# Patient Record
Sex: FEMALE | Race: NATIVE HAWAIIAN OR OTHER PACIFIC ISLANDER
[De-identification: names, ages, dates, MRNs, and addresses within clinical notes are randomized per-mention and may not be internally consistent; named-entity substitution may affect disease eponyms.]

---

## 2021-10-04 PROBLEM — Z00.00 ENCOUNTER FOR PREVENTIVE HEALTH EXAMINATION: Status: ACTIVE | Noted: 2021-10-04

## 2021-10-18 ENCOUNTER — APPOINTMENT (OUTPATIENT)
Dept: OBGYN | Facility: CLINIC | Age: 51
End: 2021-10-18
Payer: COMMERCIAL

## 2021-10-18 VITALS
DIASTOLIC BLOOD PRESSURE: 80 MMHG | WEIGHT: 200 LBS | RESPIRATION RATE: 18 BRPM | SYSTOLIC BLOOD PRESSURE: 126 MMHG | OXYGEN SATURATION: 98 % | BODY MASS INDEX: 34.15 KG/M2 | HEIGHT: 64 IN

## 2021-10-18 DIAGNOSIS — Z01.419 ENCOUNTER FOR GYNECOLOGICAL EXAMINATION (GENERAL) (ROUTINE) W/OUT ABNORMAL FINDINGS: ICD-10-CM

## 2021-10-18 PROCEDURE — 99396 PREV VISIT EST AGE 40-64: CPT

## 2021-10-20 LAB
C TRACH RRNA SPEC QL NAA+PROBE: NOT DETECTED
HPV HIGH+LOW RISK DNA PNL CVX: NOT DETECTED
N GONORRHOEA RRNA SPEC QL NAA+PROBE: NOT DETECTED
SOURCE TP AMPLIFICATION: NORMAL

## 2024-04-27 ENCOUNTER — OFFICE (OUTPATIENT)
Dept: URBAN - METROPOLITAN AREA CLINIC 12 | Facility: CLINIC | Age: 54
Setting detail: OPHTHALMOLOGY
End: 2024-04-27
Payer: COMMERCIAL

## 2024-04-27 DIAGNOSIS — H16.041: ICD-10-CM

## 2024-04-27 PROCEDURE — 99203 OFFICE O/P NEW LOW 30 MIN: CPT | Performed by: OPTOMETRIST

## 2024-05-07 ENCOUNTER — OFFICE (OUTPATIENT)
Dept: URBAN - METROPOLITAN AREA CLINIC 103 | Facility: CLINIC | Age: 54
Setting detail: OPHTHALMOLOGY
End: 2024-05-07
Payer: COMMERCIAL

## 2024-05-07 DIAGNOSIS — H16.223: ICD-10-CM

## 2024-05-07 DIAGNOSIS — H52.4: ICD-10-CM

## 2024-05-07 PROCEDURE — 92015 DETERMINE REFRACTIVE STATE: CPT | Performed by: OPTOMETRIST

## 2024-05-07 PROCEDURE — 92012 INTRM OPH EXAM EST PATIENT: CPT | Performed by: OPTOMETRIST

## 2024-05-07 ASSESSMENT — CONFRONTATIONAL VISUAL FIELD TEST (CVF)
OD_FINDINGS: FULL
OS_FINDINGS: FULL

## 2024-10-02 ENCOUNTER — APPOINTMENT (OUTPATIENT)
Dept: ORTHOPEDIC SURGERY | Facility: CLINIC | Age: 54
End: 2024-10-02

## 2024-10-02 DIAGNOSIS — M75.32 CALCIFIC TENDINITIS OF LEFT SHOULDER: ICD-10-CM

## 2024-10-02 DIAGNOSIS — Z78.9 OTHER SPECIFIED HEALTH STATUS: ICD-10-CM

## 2024-10-02 DIAGNOSIS — M75.22 BICIPITAL TENDINITIS, LEFT SHOULDER: ICD-10-CM

## 2024-10-02 DIAGNOSIS — M25.512 PAIN IN LEFT SHOULDER: ICD-10-CM

## 2024-10-02 DIAGNOSIS — M75.42 IMPINGEMENT SYNDROME OF LEFT SHOULDER: ICD-10-CM

## 2024-10-02 PROCEDURE — 73030 X-RAY EXAM OF SHOULDER: CPT | Mod: LT

## 2024-10-02 PROCEDURE — 99204 OFFICE O/P NEW MOD 45 MIN: CPT

## 2024-10-02 PROCEDURE — 73010 X-RAY EXAM OF SHOULDER BLADE: CPT | Mod: LT

## 2024-10-02 NOTE — DISCUSSION/SUMMARY
[de-identified] :  Left X-Ray Examination of the SHOULDER 2 views: no fractures, subluxations or dislocations. Calcific Density seen.  Left X-Ray Examination of the SCAPULA 1 or 2 views shows: there is an acromial spur   Assessment:   The patient is a 54 year old female with physical exam findings consistent with resolved LEFT SHOULDER CALCIFIC TENDONITIS, IMPINGEMENT SYNDROME AND BICEPS TENDONITIS     - We discussed their diagnosis and treatment options at length including the risks and benefits of both surgical and non-surgical options. - Pertinent aspects of the natural history of calcific tendonitis, impingement syndrome, and biceps tendonitis were reviewed with the patient. I also reviewed with the patient that this condition is often associated with diabetes mellitus and thyroid disorders. - We will continue conservative treatment with a course of icing, and OTC anti-inflammatory medication. - The risks, benefits, and alternatives to CSI were reviewed with the patient and they will follow up if needed.   Follow up as needed

## 2024-10-02 NOTE — IMAGING
[de-identified] : LEFT SHOULDER Inspection: No swelling.  Palpation: Tenderness is noted at the bicipital groove, anterior and lateral.  Range of motion: There is pain with range of motion. , ER 55, @90ER 90, @90IR 30 Strength: There is pain and discomfort with strength testing. Forward Flexion 4/5. Abduction 4/5. External Rotation 5-/5 and Internal Rotation 5/5   Neurological testings: motor and sensor intact distally. Ligament Stability and Special Tests:  There is positive arc of pain.  Shoulder apprehension: neg Shoulder relocation: neg Obriens test: pos Biceps Active test: neg [place] Labral Shear: neg Impingement testing: pos [place] testing: pos Whipple: pos Cross Body Adduction: neg

## 2024-10-02 NOTE — HISTORY OF PRESENT ILLNESS
[de-identified] : 10/02/2024: The patient is a 54 year old F, right hand dominant who presents today complaining of LEFT shoulder pain. Date of Injury/Onset: 09/25/24 Pain:  At Rest: 0/10 With Activity:  0/10 Mechanism of injury: NK, acute onset, woke in the middle of the night with pain, applied Icy Hot.    This is not a Work Related Injury being treated under Worker's Compensation. This is not an athletic injury occurring associated with an interscholastic or organized sports team. Quality of symptoms: Tenderness lateral  Improves with: Ibuprofen 800mg TID  Worse with: N/A Prior treatment/ Imaging: [facility]- XR  Previous injury: None  Out of work/sport: No  School/Sport/Position/Occupation:   Additional Information: NO prior shoulder issues pmh: non contributory

## 2025-01-09 ENCOUNTER — OFFICE (OUTPATIENT)
Dept: URBAN - METROPOLITAN AREA CLINIC 12 | Facility: CLINIC | Age: 55
Setting detail: OPHTHALMOLOGY
End: 2025-01-09
Payer: COMMERCIAL

## 2025-01-09 ENCOUNTER — RX ONLY (RX ONLY)
Age: 55
End: 2025-01-09

## 2025-01-09 DIAGNOSIS — B30.9: ICD-10-CM

## 2025-01-09 PROCEDURE — 99213 OFFICE O/P EST LOW 20 MIN: CPT | Performed by: STUDENT IN AN ORGANIZED HEALTH CARE EDUCATION/TRAINING PROGRAM

## 2025-01-09 ASSESSMENT — REFRACTION_CURRENTRX
OD_CYLINDER: SPH
OD_OVR_VA: 20/
OD_AXIS: 000
OD_SPHERE: -4.50
OS_AXIS: 000
OS_SPHERE: -4.50
OS_CYLINDER: SPH
OS_OVR_VA: 20/

## 2025-01-09 ASSESSMENT — REFRACTION_MANIFEST
OS_AXIS: 000
OS_CYLINDER: SPHERE
OD_CYLINDER: SPHERE
OS_VA1: 20/20
OS_SPHERE: -4.50
OD_AXIS: 000
OS_ADD: +2.25
OD_SPHERE: -4.50
OD_ADD: +2.25
OD_VA1: 20/20

## 2025-01-09 ASSESSMENT — SUPERFICIAL PUNCTATE KERATITIS (SPK)
OD_SPK: 2+
OS_SPK: 2+

## 2025-01-09 ASSESSMENT — REFRACTION_AUTOREFRACTION
OD_SPHERE: -4.50
OS_SPHERE: -4.50
OS_AXIS: 083
OD_AXIS: 135
OD_CYLINDER: -0.25
OS_CYLINDER: -0.25

## 2025-01-09 ASSESSMENT — VISUAL ACUITY
OD_BCVA: 20/30
OS_BCVA: 20/20

## 2025-01-09 ASSESSMENT — KERATOMETRY
OD_AXISANGLE_DEGREES: 071
OS_K1POWER_DIOPTERS: 43.00
OS_AXISANGLE_DEGREES: 111
OD_K2POWER_DIOPTERS: 43.25
OD_K1POWER_DIOPTERS: 42.50
OS_K2POWER_DIOPTERS: 43.25

## 2025-01-09 ASSESSMENT — CONFRONTATIONAL VISUAL FIELD TEST (CVF)
OD_FINDINGS: FULL
OS_FINDINGS: FULL

## 2025-01-09 ASSESSMENT — TONOMETRY
OS_IOP_MMHG: 17
OD_IOP_MMHG: 15

## 2025-01-27 ENCOUNTER — OFFICE (OUTPATIENT)
Dept: URBAN - METROPOLITAN AREA CLINIC 12 | Facility: CLINIC | Age: 55
Setting detail: OPHTHALMOLOGY
End: 2025-01-27
Payer: COMMERCIAL

## 2025-01-27 DIAGNOSIS — B30.9: ICD-10-CM

## 2025-01-27 PROBLEM — H16.041 CORNEAL ULCER; RIGHT EYE: Status: ACTIVE | Noted: 2025-01-27

## 2025-01-27 PROCEDURE — 99213 OFFICE O/P EST LOW 20 MIN: CPT | Performed by: STUDENT IN AN ORGANIZED HEALTH CARE EDUCATION/TRAINING PROGRAM

## 2025-01-27 ASSESSMENT — REFRACTION_MANIFEST
OD_SPHERE: -4.50
OS_VA1: 20/20
OS_CYLINDER: SPHERE
OS_AXIS: 000
OD_VA1: 20/20
OD_CYLINDER: SPHERE
OS_ADD: +2.25
OD_AXIS: 000
OS_SPHERE: -4.50
OD_ADD: +2.25

## 2025-01-27 ASSESSMENT — SUPERFICIAL PUNCTATE KERATITIS (SPK)
OS_SPK: 2+
OD_SPK: 2+

## 2025-01-27 ASSESSMENT — REFRACTION_AUTOREFRACTION
OD_CYLINDER: -0.50
OS_SPHERE: -4.00
OS_AXIS: 085
OD_AXIS: 089
OS_CYLINDER: -1.00
OD_SPHERE: -4.25

## 2025-01-27 ASSESSMENT — KERATOMETRY
OD_AXISANGLE_DEGREES: 090
OD_K1POWER_DIOPTERS: 42.50
OD_K2POWER_DIOPTERS: 42.50
OS_AXISANGLE_DEGREES: 153
OS_K1POWER_DIOPTERS: 42.50
OS_K2POWER_DIOPTERS: 42.75

## 2025-01-27 ASSESSMENT — CONFRONTATIONAL VISUAL FIELD TEST (CVF)
OD_FINDINGS: FULL
OS_FINDINGS: FULL

## 2025-01-27 ASSESSMENT — TONOMETRY
OD_IOP_MMHG: 12
OS_IOP_MMHG: 12

## 2025-01-27 ASSESSMENT — REFRACTION_CURRENTRX
OD_OVR_VA: 20/
OS_OVR_VA: 20/
OS_CYLINDER: SPH
OS_SPHERE: -4.50
OD_CYLINDER: SPH
OD_SPHERE: -4.50
OD_AXIS: 000
OS_AXIS: 000

## 2025-01-27 ASSESSMENT — VISUAL ACUITY
OD_BCVA: 20/40
OS_BCVA: 20/20-2

## 2025-05-13 ENCOUNTER — OFFICE (OUTPATIENT)
Dept: URBAN - METROPOLITAN AREA CLINIC 103 | Facility: CLINIC | Age: 55
Setting detail: OPHTHALMOLOGY
End: 2025-05-13
Payer: COMMERCIAL

## 2025-05-13 DIAGNOSIS — H16.223: ICD-10-CM

## 2025-05-13 DIAGNOSIS — H52.13: ICD-10-CM

## 2025-05-13 DIAGNOSIS — H43.393: ICD-10-CM

## 2025-05-13 PROCEDURE — 92014 COMPRE OPH EXAM EST PT 1/>: CPT | Performed by: OPTOMETRIST

## 2025-05-13 PROCEDURE — 92015 DETERMINE REFRACTIVE STATE: CPT | Performed by: OPTOMETRIST

## 2025-05-13 ASSESSMENT — KERATOMETRY
OS_K1POWER_DIOPTERS: 42.75
OS_AXISANGLE_DEGREES: 096
OS_K2POWER_DIOPTERS: 43.00
OD_K1POWER_DIOPTERS: 42.25
OD_AXISANGLE_DEGREES: 090
OD_K2POWER_DIOPTERS: 42.75

## 2025-05-13 ASSESSMENT — REFRACTION_MANIFEST
OD_CYLINDER: SPHERE
OS_ADD: +2.25
OS_AXIS: 000
OS_AXIS: 000
OD_AXIS: 000
OS_CYLINDER: SPHERE
OD_ADD: +2.25
OD_CYLINDER: SPHERE
OD_VA1: 20/20
OS_SPHERE: -4.75
OS_SPHERE: -4.50
OD_VA1: 20/20
OS_VA1: 20/20
OD_SPHERE: -4.50
OD_AXIS: 000
OS_CYLINDER: SPHERE
OS_VA1: 20/20
OD_SPHERE: -4.50

## 2025-05-13 ASSESSMENT — REFRACTION_CURRENTRX
OS_OVR_VA: 20/
OS_AXIS: 000
OD_OVR_VA: 20/
OS_CYLINDER: SPH
OD_AXIS: 000
OS_SPHERE: -4.50
OD_SPHERE: -4.50
OD_CYLINDER: SPH

## 2025-05-13 ASSESSMENT — REFRACTION_AUTOREFRACTION
OD_SPHERE: -4.75
OS_SPHERE: -5.25
OD_CYLINDER: -0.50
OS_CYLINDER: SPH
OD_AXIS: 055

## 2025-05-13 ASSESSMENT — TONOMETRY
OS_IOP_MMHG: 15
OD_IOP_MMHG: 14

## 2025-05-13 ASSESSMENT — VISUAL ACUITY
OS_BCVA: 20/25+2
OD_BCVA: 20/30

## 2025-05-13 ASSESSMENT — CONFRONTATIONAL VISUAL FIELD TEST (CVF)
OD_FINDINGS: FULL
OS_FINDINGS: FULL

## 2025-05-13 ASSESSMENT — SUPERFICIAL PUNCTATE KERATITIS (SPK)
OD_SPK: 2+
OS_SPK: 2+